# Patient Record
Sex: FEMALE | URBAN - NONMETROPOLITAN AREA
[De-identification: names, ages, dates, MRNs, and addresses within clinical notes are randomized per-mention and may not be internally consistent; named-entity substitution may affect disease eponyms.]

---

## 2019-11-21 NOTE — PATIENT DISCUSSION
Risks, Benefits and Alternatives were discussed with patient at length for Cataract Surgery. Visual symptoms are consistent with Cataract findings on examination and current refraction no longer provides satisfactory vision. Patient like s to play tennis and walk. Patients with signs of poor dilation on exam may necessitate intraocular manipulation of pupil and can be associated with surgical complications. Patient understands and desires surgery. All questions answered. Risks, Benefits and Alternatives discussed at length for IOL placement. Doctor suggests TBD. Patient desires TBD. Patient will need to wear glasses for TBD.

## 2019-12-18 NOTE — PATIENT DISCUSSION
Eye: OD Pt Preferred Package: Limited Focus Dr Recommended Package: Limited Focus Target Refraction: [place].

## 2019-12-18 NOTE — PATIENT DISCUSSION
Advised patient that dryness may worsen after cataract surgery and may need additional artificial tears.

## 2020-01-22 ENCOUNTER — IMPORTED ENCOUNTER (OUTPATIENT)
Dept: URBAN - NONMETROPOLITAN AREA CLINIC 1 | Facility: CLINIC | Age: 45
End: 2020-01-22

## 2020-01-22 PROBLEM — H52.4: Noted: 2020-01-22

## 2020-01-22 PROCEDURE — S0620 ROUTINE OPHTHALMOLOGICAL EXA: HCPCS

## 2020-01-22 NOTE — PATIENT DISCUSSION
Myopia / Astigmatism / Presbyopia OU - Discussed diagnosis in detail with patient- New glasses RX given today- Would like for Sarahy Harman to do a VF in the future being pressures are up and patient may have family history of Glaucoma - Patient may work with [employer] for CL fitting for [facility] when ready (patient is a previous wear)- Continue to monitor- RTC 1 year complete

## 2020-02-05 NOTE — PATIENT DISCUSSION
Eye: OD Pt Preferred Package: Limited Focus Dr Recommended Package: Limited Focus Target Refraction: [place].

## 2020-02-06 NOTE — PATIENT DISCUSSION
Eye: OD Pt Preferred Package: Limited Focus Dr Recommended Package: Limited Focus Target Refraction: [place].

## 2020-02-11 NOTE — PATIENT DISCUSSION
Eye: OS Pt Preferred Package: Limited Focus Dr Recommended Package: Limited Focus Target Refraction: [place].

## 2020-02-19 NOTE — PATIENT DISCUSSION
Eye: OS Pt Preferred Package: Limited Focus Dr Recommended Package: Limited Focus Target Refraction: [place].

## 2021-04-19 NOTE — PATIENT DISCUSSION
The risks, benefits and alternatives of cataract surgery were discussed with the patient. Risks including but not limited to: Infection, retinal detachment, lens dislocation, inflammation, loss of vision, increased pressure and need for further surgery. We discussed all the lens options including monofocal lens, toric lens, multifocal lens, astigmatism correction and other options. The patient understands that they may need glasses for optimal vision with any option. Detail Level: Simple Additional Note: Continue to monitor for changes Hide Include Location In Plan Question?: No

## 2022-04-09 ASSESSMENT — TONOMETRY
OD_IOP_MMHG: 24
OS_IOP_MMHG: 22

## 2022-04-09 ASSESSMENT — VISUAL ACUITY
OU_CC: 20/40
OS_SC: 20/20
OU_SC: 20/40
OD_SC: 20/20 STRUGGLE